# Patient Record
Sex: FEMALE | Race: WHITE | HISPANIC OR LATINO
[De-identification: names, ages, dates, MRNs, and addresses within clinical notes are randomized per-mention and may not be internally consistent; named-entity substitution may affect disease eponyms.]

---

## 2022-05-04 ENCOUNTER — APPOINTMENT (OUTPATIENT)
Dept: ORTHOPEDIC SURGERY | Facility: CLINIC | Age: 47
End: 2022-05-04
Payer: COMMERCIAL

## 2022-05-04 VITALS — WEIGHT: 190 LBS | BODY MASS INDEX: 34.96 KG/M2 | HEIGHT: 62 IN

## 2022-05-04 DIAGNOSIS — M77.11 LATERAL EPICONDYLITIS, RIGHT ELBOW: ICD-10-CM

## 2022-05-04 DIAGNOSIS — Z78.9 OTHER SPECIFIED HEALTH STATUS: ICD-10-CM

## 2022-05-04 PROCEDURE — 99213 OFFICE O/P EST LOW 20 MIN: CPT

## 2022-05-05 NOTE — HISTORY OF PRESENT ILLNESS
[0] : 0 [de-identified] : LATERAL EPICONDYLITIS slowly improving, but pain still persists laterally [] : no [FreeTextEntry1] : Rt elbow

## 2022-05-05 NOTE — IMAGING
[de-identified] : full active range of motion of the right shoulder, wrist and fingers\par full active range of motion of the right elbow\par Tenderness to palpation of the lateral epicondyle and proximal extensor supinator mass\par pain with resisted wrist extension and forearm supination\par 4/5 strength in supination and wrist extension, otherwise 5/5 strength\par median/ulnar/radial sensation intact to light touch\par ain/pin/ulnar motor intact\par palpable pulses CR<2s

## 2022-05-05 NOTE — ASSESSMENT
[FreeTextEntry1] : The patient was advised of the diagnosis. The natural history of the pathology was explained in full to the patient in layman's terms. All questions were answered. The risks and benefits of surgical and non-surgical treatment alternatives were explained in full to the patient.\par We discussed treatment options including nsaids, topical gels, tennis elbow strap, PT, activity modification, cortisone inj, sx .pt is aware that symptoms usually resolve on their own in 95-99% of people, but the timeframe is unknown.\par Home exercises/stretches were demonstrated and the patient practiced them as well- They are to do the exercises hourly and hold the stretch for 30 seconds. \par Avoid repetitive wrist flexion\par time was spent instructing the patient on appropriate placement of the elbow strap as well. \par  \par NSAIDs recommended.  Patient warned of risk of NSAID medication to stomach and GI tract, risk of increase blood pressure, cardiac risk, and risk of fluid retention.  The patient should clear taking medication with internist/PMD if any problem with heart, blood pressure, or GI system exists.

## 2024-03-23 ENCOUNTER — APPOINTMENT (OUTPATIENT)
Dept: ORTHOPEDIC SURGERY | Facility: CLINIC | Age: 49
End: 2024-03-23
Payer: COMMERCIAL

## 2024-03-23 VITALS — BODY MASS INDEX: 34.96 KG/M2 | WEIGHT: 190 LBS | HEIGHT: 62 IN

## 2024-03-23 DIAGNOSIS — M70.51 OTHER BURSITIS OF KNEE, RIGHT KNEE: ICD-10-CM

## 2024-03-23 PROCEDURE — 99213 OFFICE O/P EST LOW 20 MIN: CPT | Mod: 25

## 2024-03-23 PROCEDURE — 73564 X-RAY EXAM KNEE 4 OR MORE: CPT | Mod: LT

## 2024-03-23 RX ORDER — MELOXICAM 15 MG/1
15 TABLET ORAL
Qty: 30 | Refills: 0 | Status: ACTIVE | COMMUNITY
Start: 2024-03-23 | End: 1900-01-01

## 2024-03-23 NOTE — HISTORY OF PRESENT ILLNESS
[Sudden] : sudden [8] : 8 [7] : 7 [Throbbing] : throbbing [Constant] : constant [Rest] : rest [Ice] : ice [Sitting] : sitting [Lying in bed] : lying in bed [de-identified] : for one day, no injury, tried ice, worse with walking, lying down and after sitting, no swelling, no prior issues [] : no [FreeTextEntry6] : pulling sensation  [FreeTextEntry1] : LT knee  [FreeTextEntry5] : NO INJURY, pain began yesterday morning. Some swelling indicated.

## 2024-03-23 NOTE — DISCUSSION/SUMMARY
[de-identified] : modify activities try OTC meds ice as needed try topical lidocaine reviewed current medications used by this patient offered csi she delcined an dpshahram PT

## 2025-01-29 ENCOUNTER — NON-APPOINTMENT (OUTPATIENT)
Age: 50
End: 2025-01-29

## 2025-02-11 ENCOUNTER — APPOINTMENT (OUTPATIENT)
Dept: ORTHOPEDIC SURGERY | Facility: CLINIC | Age: 50
End: 2025-02-11
Payer: COMMERCIAL

## 2025-02-11 DIAGNOSIS — M17.12 UNILATERAL PRIMARY OSTEOARTHRITIS, LEFT KNEE: ICD-10-CM

## 2025-02-11 DIAGNOSIS — S83.8X2A SPRAIN OF OTHER SPECIFIED PARTS OF LEFT KNEE, INITIAL ENCOUNTER: ICD-10-CM

## 2025-02-11 PROCEDURE — 99204 OFFICE O/P NEW MOD 45 MIN: CPT | Mod: 25

## 2025-02-11 PROCEDURE — 73564 X-RAY EXAM KNEE 4 OR MORE: CPT | Mod: LT

## 2025-02-11 PROCEDURE — 20611 DRAIN/INJ JOINT/BURSA W/US: CPT | Mod: LT

## 2025-03-28 ENCOUNTER — APPOINTMENT (OUTPATIENT)
Dept: ORTHOPEDIC SURGERY | Facility: CLINIC | Age: 50
End: 2025-03-28
Payer: COMMERCIAL

## 2025-03-28 ENCOUNTER — TRANSCRIPTION ENCOUNTER (OUTPATIENT)
Age: 50
End: 2025-03-28

## 2025-03-28 VITALS — HEIGHT: 62 IN | WEIGHT: 190 LBS | BODY MASS INDEX: 34.96 KG/M2

## 2025-03-28 DIAGNOSIS — M17.12 UNILATERAL PRIMARY OSTEOARTHRITIS, LEFT KNEE: ICD-10-CM

## 2025-03-28 PROCEDURE — G2211 COMPLEX E/M VISIT ADD ON: CPT | Mod: NC

## 2025-03-28 PROCEDURE — 99214 OFFICE O/P EST MOD 30 MIN: CPT

## 2025-03-28 RX ORDER — SODIUM HYALURONATE INTRA-ARTICULAR SOLN PREF SYR 25 MG/2.5ML 25/2.5 MG/ML
25 SOLUTION PREFILLED SYRINGE INTRAARTICULAR
Qty: 5 | Refills: 0 | Status: ACTIVE | OUTPATIENT
Start: 2025-03-28

## 2025-03-28 RX ORDER — MELOXICAM 7.5 MG/1
7.5 TABLET ORAL
Qty: 60 | Refills: 0 | Status: ACTIVE | COMMUNITY
Start: 2025-03-28 | End: 1900-01-01

## 2025-03-28 NOTE — HISTORY OF PRESENT ILLNESS
[de-identified] : Patient is a 49-year-old female presenting for evaluation of 4 months of left knee pain.  Her knee pain began approximately in October.  She denies any fall or trauma but does states she downsized to a small car from an SUV at that point.  Her knee pain is localized to the medial aspect of the left knee.  Pain is worse with weight-bear activity including walking long distance and rising from a seated position.  She does have intermittent swelling in the leg.  Patient went for an MRI which revealed medial meniscus tearing as well as mild chondral wear.  She had some form of injection 6 weeks ago without lasting relief.  Patient has tried physical therapy and anti-inflammatories with minimal improvement.

## 2025-03-28 NOTE — DISCUSSION/SUMMARY
[de-identified] :  SUMA BACA is a 49 year old female who presents with moderate varus arthritis of the left knee.  Nonoperative treatment options for knee arthritis were discussed including anti-inflammatories, physical therapy, intraarticular cortisone injections, and hyaluronic acid gel injections.   A script for PT was provided. I recommended a course of Mobic. The patient was given a prescription for the Mobic with directions. The patient was instructed to stop the medicine and call the office if there are any adverse reaction to the medicine. The patient was also instructed to consult with their primary care doctor prior to starting the medication. Gel ordered. Follow up once approved.

## 2025-04-25 ENCOUNTER — APPOINTMENT (OUTPATIENT)
Dept: ORTHOPEDIC SURGERY | Facility: CLINIC | Age: 50
End: 2025-04-25

## 2025-05-02 ENCOUNTER — APPOINTMENT (OUTPATIENT)
Dept: ORTHOPEDIC SURGERY | Facility: CLINIC | Age: 50
End: 2025-05-02

## 2025-05-09 ENCOUNTER — APPOINTMENT (OUTPATIENT)
Dept: ORTHOPEDIC SURGERY | Facility: CLINIC | Age: 50
End: 2025-05-09
Payer: COMMERCIAL

## 2025-05-09 PROCEDURE — 99213 OFFICE O/P EST LOW 20 MIN: CPT | Mod: 25

## 2025-05-09 PROCEDURE — 20610 DRAIN/INJ JOINT/BURSA W/O US: CPT | Mod: LT

## 2025-05-09 NOTE — DISCUSSION/SUMMARY
[de-identified] :  SUMA BACA is a 49 year old female who presents with moderate varus arthritis of the left knee.  Nonoperative treatment options for knee arthritis were discussed including anti-inflammatories, physical therapy, intraarticular cortisone injections, and hyaluronic acid gel injections.   The patient received the first of 5 left knee supartz injections. Follow up in one week.

## 2025-05-09 NOTE — HISTORY OF PRESENT ILLNESS
[de-identified] : Patient is a 49-year-old female presenting for evaluation of 4 months of left knee pain.  Her knee pain began approximately in October.  She denies any fall or trauma but does states she downsized to a small car from an SUV at that point.  Her knee pain is localized to the medial aspect of the left knee.  Pain is worse with weight-bear activity including walking long distance and rising from a seated position.  She does have intermittent swelling in the leg.  Patient went for an MRI which revealed medial meniscus tearing as well as mild chondral wear.  She had some form of injection but is not sure what back in December 2024.  Patient seems to think this injection was neither steroid nor gel.  Patient has tried physical therapy and anti-inflammatories with minimal improvement.

## 2025-05-09 NOTE — PHYSICAL EXAM
[de-identified] :  The patient appears well nourished and in no apparent distress. The patient is alert and oriented to person, place, and time. Affect and mood appear normal. The head is normocephalic and atraumatic. The eyes reveal normal sclera and extra ocular muscles are intact. The tongue is midline with no apparent lesions. Skin shows normal turgor with no evidence of eczema or psoriasis. No respiratory distress noted. Sensation grossly intact. [de-identified] :  Exam of the left knee shows 0 to 120 degrees of flexion measured with a goniometer.  5/5 motor strength bilaterally distally. Sensation intact distally. [de-identified] : X-ray: 4 views of the left knee demonstrate moderate varus arthritis

## 2025-05-09 NOTE — PROCEDURE
[de-identified] : Allergies: The patient denies allergies to medications and has no allergies to chicken,eggs, or feathers. Procedure: The patient has been identified by name and date of birth. Patient confirms that we are treating the left knee today. The knee was prepped in the usual sterile fashion. The areas were cleansed with chlorhexadine, then sprayed with ethyl chloride. The patient was then injected with the Supartz into the left knee in the anterolateral position. The patient tolerated the procedure well. The medication was delivered aseptically and atraumatically. Diagnosis: Osteoarthritis of the left knee Treatment: The patient was advised on the activities for today. I gave the patient instructions on postinjection ice and analgesia.

## 2025-05-16 ENCOUNTER — APPOINTMENT (OUTPATIENT)
Dept: ORTHOPEDIC SURGERY | Facility: CLINIC | Age: 50
End: 2025-05-16
Payer: COMMERCIAL

## 2025-05-16 VITALS — WEIGHT: 190 LBS | HEIGHT: 62 IN | BODY MASS INDEX: 34.96 KG/M2

## 2025-05-16 PROCEDURE — 20610 DRAIN/INJ JOINT/BURSA W/O US: CPT | Mod: LT

## 2025-05-16 NOTE — HISTORY OF PRESENT ILLNESS
[de-identified] : The patient is here today for a Supartz injection for the left knee.  Allergies: The patient denies allergies to medications and has no allergies to chicken,eggs, or feathers. Procedure: The patient has been identified by name and date of birth. Patient confirms that we are treating the left knee today. The knee was prepped in the usual sterile fashion. The areas were cleansed with chlorhexadine, then sprayed with ethyl chloride. The patient was then injected with the Supartz into the left knee in the anterolateral position. The patient tolerated the procedure well. The medication was delivered aseptically and atraumatically. Diagnosis: Osteoarthritis of the left knee Treatment: The patient was advised on the activities for today. I gave the patient instructions on postinjection ice and analgesia. Follow up recommended in one week.

## 2025-05-16 NOTE — REASON FOR VISIT
[Follow-Up Visit] : a follow-up visit for [FreeTextEntry2] : left knee supartz inj#2 lot# [id number] exp 2028/02/29

## 2025-05-22 ENCOUNTER — APPOINTMENT (OUTPATIENT)
Dept: ORTHOPEDIC SURGERY | Facility: CLINIC | Age: 50
End: 2025-05-22
Payer: COMMERCIAL

## 2025-05-22 PROCEDURE — 20610 DRAIN/INJ JOINT/BURSA W/O US: CPT | Mod: LT

## 2025-05-22 NOTE — HISTORY OF PRESENT ILLNESS
[de-identified] : The patient is here today for a Supartz injection for the left knee.  Allergies: The patient denies allergies to medications and has no allergies to chicken,eggs, or feathers. Procedure: The patient has been identified by name and date of birth. Patient confirms that we are treating the left knee today. The knee was prepped in the usual sterile fashion. The areas were cleansed with chlorhexadine, then sprayed with ethyl chloride. The patient was then injected with the Supartz into the left knee in the anterolateral position. The patient tolerated the procedure well. The medication was delivered aseptically and atraumatically. Diagnosis: Osteoarthritis of the left knee Treatment: The patient was advised on the activities for today. I gave the patient instructions on postinjection ice and analgesia. Follow up recommended in one week.

## 2025-05-22 NOTE — REASON FOR VISIT
[Follow-Up Visit] : a follow-up visit for [Other: ____] : [unfilled] [FreeTextEntry2] : left knee supartz inj#3 lot# [id number] exp 2028/02/29.

## 2025-05-28 ENCOUNTER — APPOINTMENT (OUTPATIENT)
Dept: ORTHOPEDIC SURGERY | Facility: CLINIC | Age: 50
End: 2025-05-28

## 2025-05-29 ENCOUNTER — APPOINTMENT (OUTPATIENT)
Dept: ORTHOPEDIC SURGERY | Facility: CLINIC | Age: 50
End: 2025-05-29
Payer: COMMERCIAL

## 2025-05-29 PROCEDURE — 20610 DRAIN/INJ JOINT/BURSA W/O US: CPT | Mod: LT

## 2025-05-29 NOTE — HISTORY OF PRESENT ILLNESS
[de-identified] : The patient is here today for the fourth Supartz injection for the left knee.  Allergies: The patient denies allergies to medications and has no allergies to chicken,eggs, or feathers. Procedure: The patient has been identified by name and date of birth. Patient confirms that we are treating the left knee today. The knee was prepped in the usual sterile fashion. The areas were cleansed with chlorhexadine, then sprayed with ethyl chloride. The patient was then injected with the Supartz into the left knee in the anterolateral position. The patient tolerated the procedure well. The medication was delivered aseptically and atraumatically. Diagnosis: Osteoarthritis of the left knee Treatment: The patient was advised on the activities for today. I gave the patient instructions on postinjection ice and analgesia. Follow up recommended in one week.

## 2025-05-29 NOTE — REASON FOR VISIT
[Follow-Up Visit] : a follow-up visit for [Other: ____] : [unfilled] [FreeTextEntry2] :  left knee supartz inj#4 lot# [id number] exp 2028/02/29.

## 2025-06-06 ENCOUNTER — APPOINTMENT (OUTPATIENT)
Dept: ORTHOPEDIC SURGERY | Facility: CLINIC | Age: 50
End: 2025-06-06
Payer: COMMERCIAL

## 2025-06-06 VITALS — WEIGHT: 190 LBS | BODY MASS INDEX: 34.96 KG/M2 | HEIGHT: 62 IN

## 2025-06-06 PROCEDURE — 20610 DRAIN/INJ JOINT/BURSA W/O US: CPT | Mod: LT

## 2025-06-06 NOTE — HISTORY OF PRESENT ILLNESS
[de-identified] : The patient is here today for a Supartz injection for the left knee.  Allergies: The patient denies allergies to medications and has no allergies to chicken,eggs, or feathers. Procedure: The patient has been identified by name and date of birth. Patient confirms that we are treating the left knee today. The knee was prepped in the usual sterile fashion. The areas were cleansed with chlorhexadine, then sprayed with ethyl chloride. The patient was then injected with the Supartz into the left knee in the anterolateral position. The patient tolerated the procedure well. The medication was delivered aseptically and atraumatically. Diagnosis: Osteoarthritis of the left knee Treatment: The patient was advised on the activities for today. I gave the patient instructions on postinjection ice and analgesia. Follow up recommended in 6 weeks.

## 2025-06-06 NOTE — REASON FOR VISIT
[Follow-Up Visit] : a follow-up visit for [FreeTextEntry2] : left knee supartz inj#5 lot# [id number] exp 2028/02/29.

## 2025-07-11 ENCOUNTER — APPOINTMENT (OUTPATIENT)
Dept: ORTHOPEDIC SURGERY | Facility: CLINIC | Age: 50
End: 2025-07-11
Payer: COMMERCIAL

## 2025-07-11 VITALS — HEIGHT: 62 IN | WEIGHT: 190 LBS | BODY MASS INDEX: 34.96 KG/M2

## 2025-07-11 PROCEDURE — 99215 OFFICE O/P EST HI 40 MIN: CPT

## 2025-07-11 PROCEDURE — G2211 COMPLEX E/M VISIT ADD ON: CPT | Mod: NC

## 2025-07-11 PROCEDURE — 73564 X-RAY EXAM KNEE 4 OR MORE: CPT | Mod: LT

## 2025-07-11 NOTE — DISCUSSION/SUMMARY
[de-identified] :  SUMA BACA is a 49 year old female who presents with severe varus arthritis of the left knee.  Nonoperative treatment options for knee arthritis were discussed including anti-inflammatories, physical therapy, intraarticular cortisone injections, and hyaluronic acid gel injections. The patient has exhausted a minimum of 3 months conservative treatment including prior injections, physical therapy, over the counter NSAIDS and pain medication where indicated. In addition, the patient's quality of life is diminished due to significant chronic pain. The patient is having difficulty ambulating, descending stairs, and rising from a seated position.  Based upon the patient's continued symptoms and failure to respond to conservative treatment, I have recommended a left total knee replacement for this patient. A long discussion took place with the patient describing what a total joint replacement is and what the procedure would entail. A knee model, similar to the implants that will be used during the operation, was utilized to demonstrate the implants. Choices of implant manufactures were discussed and reviewed. The ability to secure the implant utilizing cement or cementless (press fit) fixation was discussed. The patient agrees with the plan of care as well as the use of implants.   The hospitalization and post-operative care and rehabilitation were also discussed. The use of perioperative antibiotics and DVT prophylaxis were discussed. The risk, benefits and alternatives to a surgical intervention were discussed at length with the patient. The patient was also advised of risks related to the medical comorbidities and elevated body mass index (BMI). A lengthy discussion took place to review the most common complications including but not limited to: deep vein thrombosis, pulmonary embolus, heart attack, stroke, infection, wound breakdown, numbness, damage to nerves, tendon, muscles, arteries or other blood vessels, death and other possible complications from anesthesia. The patient was told that we will take steps to minimize these risks by using sterile technique, antibiotics and DVT prophylaxis when appropriate and follow the patient postoperatively in the office setting to monitor progress. The possibility of recurrent pain, no improvement in pain and actual worsening of pain were also discussed with the patient.   The discharge plan of care focused on the patient going home following surgery. The patient was encouraged to make the necessary arrangements to have someone stay with them when they are discharged home. Following discharge, a home care nurse will visit the patient. The home care nurse will open your home care case and request home physical therapy services. Home physical therapy will commence following discharge provided it is appropriate and covered by the health insurance benefit plan.   The benefits of surgery were discussed with the patient including the potential for improving the patient's current clinical condition through operative intervention. Alternatives to surgical intervention including continued conservative management were also discussed in detail. All questions were answered to the satisfaction of the patient. The treatment plan of care, as well as a model of a total knee equivalent to the one that will be used for their total joint replacement, was shared with the patient. The patient participated and agreed to the plan of care as well as the use of the recommended implants for their total joint replacement surgery.   We discussed that the knee replacement will be done with robotic assistance to enhance accuracy and dynamic joint balancing.   The patient will receive a 1 ML injection of 15MG/ML Toradol the night of surgery and the following morning for inflammation and pain control.

## 2025-07-11 NOTE — HISTORY OF PRESENT ILLNESS
[de-identified] : Patient is a 49-year-old female presenting for evaluation of 6 months of left knee pain.  Her knee pain began approximately in October.  She denies any fall or trauma but does states she downsized to a small car from an SUV at that point.  Her knee pain is localized to the medial aspect of the left knee.  Pain is worse with weight-bear activity including walking long distance and rising from a seated position.  She does have intermittent swelling in the leg.  Patient went for an MRI which revealed medial meniscus tearing as well as mild chondral wear. Patient has tried physical therapy and anti-inflammatories with minimal improvement. Patient had both cortisone and gel injections in the past without significant improvement. Her most recent injection was 6/6/25.

## 2025-07-11 NOTE — PHYSICAL EXAM
[de-identified] :  The patient appears well nourished and in no apparent distress. The patient is alert and oriented to person, place, and time. Affect and mood appear normal. The head is normocephalic and atraumatic. The eyes reveal normal sclera and extra ocular muscles are intact. The tongue is midline with no apparent lesions. Skin shows normal turgor with no evidence of eczema or psoriasis. No respiratory distress noted. Sensation grossly intact. [de-identified] :  Exam of the left knee shows +5 to 119 degrees of flexion measured with a goniometer. There is pain with flexion. 5/5 motor strength bilaterally distally. Sensation intact distally. [de-identified] : X-ray: 4 views of the left knee demonstrate severe varus arthritis

## 2025-07-11 NOTE — ADDENDUM
[FreeTextEntry1] :  I personally performed the services described in the documentation, reviewed the documentation recorded by the scribe in my presence and it accurately and completely records my words and actions.

## 2025-08-02 ENCOUNTER — NON-APPOINTMENT (OUTPATIENT)
Age: 50
End: 2025-08-02

## 2025-09-08 ENCOUNTER — APPOINTMENT (OUTPATIENT)
Dept: CT IMAGING | Facility: CLINIC | Age: 50
End: 2025-09-08
Payer: COMMERCIAL

## 2025-09-08 PROCEDURE — 73700 CT LOWER EXTREMITY W/O DYE: CPT | Mod: LT

## 2025-09-18 ENCOUNTER — NON-APPOINTMENT (OUTPATIENT)
Age: 50
End: 2025-09-18